# Patient Record
Sex: MALE | Race: WHITE | Employment: FULL TIME | URBAN - METROPOLITAN AREA
[De-identification: names, ages, dates, MRNs, and addresses within clinical notes are randomized per-mention and may not be internally consistent; named-entity substitution may affect disease eponyms.]

---

## 2023-02-02 ENCOUNTER — HOSPITAL ENCOUNTER (EMERGENCY)
Facility: CLINIC | Age: 43
Discharge: HOME | End: 2023-02-02
Attending: EMERGENCY MEDICINE

## 2023-02-02 ENCOUNTER — APPOINTMENT (OUTPATIENT)
Dept: GENERAL RADIOLOGY | Facility: CLINIC | Age: 43
End: 2023-02-02

## 2023-02-02 VITALS
HEIGHT: 66 IN | WEIGHT: 155 LBS | SYSTOLIC BLOOD PRESSURE: 120 MMHG | TEMPERATURE: 98.4 F | RESPIRATION RATE: 18 BRPM | HEART RATE: 63 BPM | DIASTOLIC BLOOD PRESSURE: 71 MMHG | OXYGEN SATURATION: 98 % | BODY MASS INDEX: 24.91 KG/M2

## 2023-02-02 DIAGNOSIS — S60.221A CONTUSION OF RIGHT HAND, INITIAL ENCOUNTER: Primary | ICD-10-CM

## 2023-02-02 DIAGNOSIS — S63.501A SPRAIN OF RIGHT WRIST, INITIAL ENCOUNTER: ICD-10-CM

## 2023-02-02 PROCEDURE — 99283 EMERGENCY DEPT VISIT LOW MDM: CPT

## 2023-02-02 PROCEDURE — 29125 APPL SHORT ARM SPLINT STATIC: CPT

## 2023-02-02 PROCEDURE — 73110 X-RAY EXAM OF WRIST: CPT

## 2023-02-02 PROCEDURE — 73130 X-RAY EXAM OF HAND: CPT

## 2023-02-02 ASSESSMENT — PAIN SCALES - GENERAL: PAINLEVEL_OUTOF10: 3

## 2023-02-02 ASSESSMENT — PAIN DESCRIPTION - DESCRIPTORS: DESCRIPTORS: DISCOMFORT

## 2023-02-02 ASSESSMENT — PAIN - FUNCTIONAL ASSESSMENT: PAIN_FUNCTIONAL_ASSESSMENT: 0-10

## 2023-02-02 ASSESSMENT — PAIN DESCRIPTION - LOCATION: LOCATION: HAND

## 2023-02-02 ASSESSMENT — PAIN DESCRIPTION - ORIENTATION: ORIENTATION: RIGHT

## 2023-02-02 NOTE — ED PROVIDER NOTES
[address] [facility] ED  eMERGENCY dEPARTMENT eNCOUnter   Independent Attestation     Pt Name: Joshua Dupont  MRN: [id number]  [place] 1980  Date of evaluation: 2/2/23       Joshua Dupont is a 38 y.o. male who presents with Hand Pain        Based on the medical record, the care appears appropriate. I was personally available for consultation in the Emergency Department.     Jacobo Sow DO  Attending Emergency  Physician                  Jacobo Sow DO  02/02/23 1145

## 2023-02-02 NOTE — ED PROVIDER NOTES
[address] ED  EMERGENCY DEPARTMENT ENCOUNTER      Pt Name: Ilan Myers  MRN: [id number]  [place] 1980  Date of evaluation: 2/2/2023  Provider: ANTOINETTE Cooney CNP    CHIEF COMPLAINT       Chief Complaint   Patient presents with    Hand Pain     Pt reports was joking around with rico and hit him and injured right hand, pain radiates to wrist, swelling noted to right hand          HISTORY OF PRESENT ILLNESS   (Location/Symptom, Timing/Onset, Context/Setting, Quality, Duration, Modifying Factors, Severity)  Note limiting factors. Ilan Myers is a 38 y.o. male who presents to the emergency department for evaluation of right hand and wrist pain after punching a wall yesterday when he and some friends were messing around. He reports that the third and fourth knuckles on the right hand are the most painful. He has some swelling and feels like his movement is restricted in the hand. He also has some pain that is into the wrist and feels a popping sensation in the right wrist.  No numbness no tingling. Does report a feeling of decreased strength        Nursing Notes were reviewed. REVIEW OF SYSTEMS    (2-9 systems for level 4, 10 or more for level 5)     Review of Systems   Musculoskeletal:         Right hand pain, right wrist pain, injury   All other systems reviewed and are negative. Except as noted above the remainder of the review of systems was reviewed and negative. PAST MEDICAL HISTORY   History reviewed. No pertinent past medical history. SURGICAL HISTORY       Past Surgical History:   Procedure Laterality Date    TIBIA FRACTURE SURGERY Right          CURRENT MEDICATIONS       Previous Medications    OFLOXACIN (OCUFLOX) 0.3 % SOLUTION    Place 3 drops into the left eye 4 times daily       ALLERGIES     Patient has no known allergies. FAMILY HISTORY     History reviewed. No pertinent family history.        SOCIAL HISTORY       Social History     Socioeconomic History Marital status: Single     Spouse name: None    Number of children: None    Years of education: None    Highest education level: None   Tobacco Use    Smoking status: Never   Substance and Sexual Activity    Alcohol use: No    Drug use: No       SCREENINGS         Tiffani Coma Scale  Eye Opening: Spontaneous  Best Verbal Response: Oriented  Best Motor Response: Obeys commands  Itffani Coma Scale Score: 15                     CIWA Assessment  BP: 120/71  Heart Rate: 63                 PHYSICAL EXAM    (up to 7 for level 4, 8 or more for level 5)     ED Triage Vitals [02/02/23 1126]   BP Temp Temp Source Heart Rate Resp SpO2 Height Weight   120/71 98.4 °F (36.9 °C) Oral 63 18 98 % 5' 6\" (1.676 m) 155 lb (70.3 kg)       Physical Exam  Vitals and nursing note reviewed. Constitutional:       General: He is not in acute distress. Appearance: He is normal weight. HENT:      Head: Normocephalic and atraumatic. Right Ear: External ear normal.      Left Ear: External ear normal.      Nose: Nose normal. No congestion. Mouth/Throat:      Mouth: Mucous membranes are moist.      Pharynx: Oropharynx is clear. Eyes:      Extraocular Movements: Extraocular movements intact. Conjunctiva/sclera: Conjunctivae normal.   Cardiovascular:      Rate and Rhythm: Normal rate and regular rhythm. Pulses: Normal pulses. Heart sounds: Normal heart sounds. Pulmonary:      Effort: Pulmonary effort is normal. No respiratory distress. Breath sounds: Normal breath sounds. Musculoskeletal:      Right hand: Swelling and tenderness present. No deformity or lacerations. Decreased range of motion. Decreased strength of thumb/finger opposition. Normal strength of finger abduction and wrist extension. Normal sensation. Normal capillary refill. Normal pulse. Hands:       Cervical back: Normal range of motion and neck supple. Comments: Swelling noted to the right third and fourth knuckles only.   No significant ecchymosis, no signs of compartment syndrome, intrinsic movements of the hand are intact he does have a little difficulty with thumb to pinky opposition. He is able to flex and extend the wrist.  Palpation of the wrist does not elicit significant pain PMS intact distal to injury follow-up with orthopedics if pain does not improve. Rest ice elevate. Return for any worsening symptoms or concerns   Skin:     General: Skin is warm and dry. Capillary Refill: Capillary refill takes less than 2 seconds. Neurological:      General: No focal deficit present. Mental Status: He is alert and oriented to person, place, and time. Psychiatric:         Mood and Affect: Mood normal.         Behavior: Behavior normal.       DIAGNOSTIC RESULTS     EKG: All EKG's are interpreted by the Emergency Department Physician who either signs or Co-signs this chart in the absence of a cardiologist.        RADIOLOGY:   Non-plain film images such as CT, Ultrasound and MRI are read by the radiologist. Plain radiographic images are visualized and preliminarily interpreted by the emergency physician with the below findings:        Interpretation per the Radiologist below, if available at the time of this note:    XR HAND RIGHT (MIN 3 VIEWS)   Final Result   Right wrist:      No acute fracture or dislocation. Right hand:      No acute fracture or dislocation. XR WRIST RIGHT (MIN 3 VIEWS)   Final Result   Right wrist:      No acute fracture or dislocation. Right hand:      No acute fracture or dislocation. XR WRIST RIGHT (MIN 3 VIEWS)    Result Date: 2/2/2023  EXAMINATION: 3 XRAY VIEWS OF THE RIGHT WRIST; THREE XRAY VIEWS OF THE RIGHT HAND 2/2/2023 11:35 am COMPARISON: None.  HISTORY: ORDERING SYSTEM PROVIDED HISTORY: punched a wall, pain TECHNOLOGIST PROVIDED HISTORY: punched a wall, pain Reason for Exam: Right hand and wrist s/p punched a wall 58-year-old male with right wrist and right hand pain after punching a wall FINDINGS: Right wrist: Joint spaces are well maintained. Osseous alignment is normal.  No marginal erosions are identified. No acute fracture or gross dislocation is seen. Scaphoid appears grossly intact. No significant soft tissue swelling is identified. Subcortical cystic changes along the radial aspect of the distal ulna. Right hand: Osseous alignment is normal. Joint spaces are well maintained. No marginal erosions are identified. No acute fracture or gross dislocation is seen. No focal soft tissue swelling is evident. Right wrist: No acute fracture or dislocation. Right hand: No acute fracture or dislocation. XR HAND RIGHT (MIN 3 VIEWS)    Result Date: 2/2/2023  EXAMINATION: 3 XRAY VIEWS OF THE RIGHT WRIST; THREE XRAY VIEWS OF THE RIGHT HAND 2/2/2023 11:35 am COMPARISON: None. HISTORY: ORDERING SYSTEM PROVIDED HISTORY: punched a wall, pain TECHNOLOGIST PROVIDED HISTORY: punched a wall, pain Reason for Exam: Right hand and wrist s/p punched a wall 42-year-old male with right wrist and right hand pain after punching a wall FINDINGS: Right wrist: Joint spaces are well maintained. Osseous alignment is normal.  No marginal erosions are identified. No acute fracture or gross dislocation is seen. Scaphoid appears grossly intact. No significant soft tissue swelling is identified. Subcortical cystic changes along the radial aspect of the distal ulna. Right hand: Osseous alignment is normal. Joint spaces are well maintained. No marginal erosions are identified. No acute fracture or gross dislocation is seen. No focal soft tissue swelling is evident. Right wrist: No acute fracture or dislocation. Right hand: No acute fracture or dislocation. ED BEDSIDE ULTRASOUND:   Performed by ED Physician - none    LABS:  Labs Reviewed - No data to display    All other labs were within normal range or not returned as of this dictation.     EMERGENCY DEPARTMENT COURSE and DIFFERENTIAL DIAGNOSIS/MDM:   Vitals:    Vitals:    02/02/23 1126   BP: 120/71   Pulse: 63   Resp: 18   Temp: 98.4 °F (36.9 °C)   TempSrc: Oral   SpO2: 98%   Weight: 70.3 kg (155 lb)   Height: 5' 6\" (1.676 m)           Medical Decision Making  Amount and/or Complexity of Data Reviewed  Radiology: ordered. X-rays do not reveal any significant fracture or dislocation. He is placed in a wrist splint for comfort. We discussed follow up with orthopedics if symptoms persist or do not improve. He acknowledges understanding and is agreeable to the discharge plan of care. REASSESSMENT          CRITICAL CARE TIME       CONSULTS:  None    PROCEDURES:  Unless otherwise noted below, none     Procedures        FINAL IMPRESSION      1. Contusion of right hand, initial encounter    2. Sprain of right wrist, initial encounter          DISPOSITION/PLAN   DISPOSITION Decision To Discharge 02/02/2023 11:58:44 AM      PATIENT REFERRED TO:  419-same-day    In 2 days  If symptoms worsen    [facility]  [address]  [phone]    If symptoms worsen    Sarah Winter MD  [facility] [address]  [phone]      If symptoms worsen      DISCHARGE MEDICATIONS:  New Prescriptions    No medications on file     Controlled Substances Monitoring:     No flowsheet data found.     (Please note that portions of this note were completed with a voice recognition program.  Efforts were made to edit the dictations but occasionally words are mis-transcribed.)    ANTOINETTE Rogers CNP (electronically signed)  Attending Emergency Physician           ANTOINETTE Rogers CNP  02/02/23 5428

## 2023-02-02 NOTE — DISCHARGE INSTRUCTIONS
Home. Rest, ice, elevate. Tylenol and motrin for pain. Wear splint for comfort. Follow up with ortho if symptoms persist or are not improving.

## 2023-02-02 NOTE — Clinical Note
Roselie Gilford was seen and treated in our emergency department on 2/2/2023. He may return to work on 02/03/2023. If you have any questions or concerns, please don't hesitate to call.       Chano Damon, APRN - CNP